# Patient Record
Sex: FEMALE | Race: WHITE | Employment: FULL TIME | URBAN - METROPOLITAN AREA
[De-identification: names, ages, dates, MRNs, and addresses within clinical notes are randomized per-mention and may not be internally consistent; named-entity substitution may affect disease eponyms.]

---

## 2018-01-02 ENCOUNTER — OFFICE VISIT (OUTPATIENT)
Dept: FAMILY MEDICINE CLINIC | Age: 33
End: 2018-01-02

## 2018-01-02 VITALS
BODY MASS INDEX: 26.63 KG/M2 | SYSTOLIC BLOOD PRESSURE: 106 MMHG | RESPIRATION RATE: 18 BRPM | HEIGHT: 64 IN | DIASTOLIC BLOOD PRESSURE: 66 MMHG | TEMPERATURE: 98 F | OXYGEN SATURATION: 97 % | WEIGHT: 156 LBS | HEART RATE: 86 BPM

## 2018-01-02 DIAGNOSIS — B37.31 CANDIDA VAGINITIS: ICD-10-CM

## 2018-01-02 DIAGNOSIS — J06.9 UPPER RESPIRATORY TRACT INFECTION, UNSPECIFIED TYPE: Primary | ICD-10-CM

## 2018-01-02 DIAGNOSIS — F33.2 SEVERE EPISODE OF RECURRENT MAJOR DEPRESSIVE DISORDER, WITHOUT PSYCHOTIC FEATURES (HCC): ICD-10-CM

## 2018-01-02 LAB
QUICKVUE INFLUENZA TEST: NEGATIVE
VALID INTERNAL CONTROL?: YES

## 2018-01-02 RX ORDER — HYDROCODONE POLISTIREX AND CHLORPHENIRAMINE POLISTIREX 10; 8 MG/5ML; MG/5ML
5 SUSPENSION, EXTENDED RELEASE ORAL
Qty: 115 ML | Refills: 0 | Status: ON HOLD | OUTPATIENT
Start: 2018-01-02 | End: 2020-06-06

## 2018-01-02 RX ORDER — SERTRALINE HYDROCHLORIDE 50 MG/1
TABLET, FILM COATED ORAL
Qty: 60 TAB | Refills: 0 | Status: ON HOLD | OUTPATIENT
Start: 2018-01-02 | End: 2020-06-05

## 2018-01-02 RX ORDER — FLUCONAZOLE 150 MG/1
150 TABLET ORAL DAILY
Qty: 1 TAB | Refills: 0 | Status: SHIPPED | OUTPATIENT
Start: 2018-01-02 | End: 2018-01-03

## 2018-01-02 RX ORDER — SERTRALINE HYDROCHLORIDE 100 MG/1
TABLET, FILM COATED ORAL DAILY
COMMUNITY
End: 2018-01-02 | Stop reason: SDUPTHER

## 2018-01-02 NOTE — PROGRESS NOTES
OFFICE NOTE    Consuelo Callaway is a 33 y.o. female presenting today for office visit. 1/2/2018  3:19 PM      Chief Complaint   Patient presents with    Medication Refill    Establish Care    Cough     lightheaded 2 days     Fever     102 with sweats     Vaginal Discharge     no odor x2 days         HPI: Here today as a new patient, not establishing care. Follows with the [facility] in [place] but reports that they are not able to get her in for another few weeks. Complains of cold symptoms that have been happening for the last few days. Reports complaints of nonproductive cough, facial pressure, nasal congestion with runniness, fatigue, chills, fever (102 this AM), and feelings of lightheadedness. She reports taking OTC medications without much relief of symptoms. Has been around sick contacts. Did not get flu shot this year. Has not been on ABs recently. Also complains of thick white vaginal discharge that has been happening for a few days- consistent with past yeast infections. Requesting to start back on Zoloft- has been out of for the last 2-3 weeks since not able to be seen with PCP. Reports that she is currently going through a divorce, has 2 children, and is looking to move into the area. Has been with her  for about 10 years. Has been on Zoloft chronically but since being off, she has been having an increase in depression and anxiety symptoms. Denies HI/SI. Is employed and functioning well in her job. Review of Systems   Constitutional: Positive for chills, fatigue and fever. Negative for appetite change and unexpected weight change. HENT: Positive for congestion, postnasal drip, rhinorrhea, sinus pressure, sneezing and sore throat. Negative for ear pain and facial swelling. Eyes: Negative for pain, discharge, itching and visual disturbance. Respiratory: Positive for cough. Negative for shortness of breath and wheezing.     Cardiovascular: Negative for chest pain.   Gastrointestinal: Negative for abdominal pain, diarrhea, nausea and vomiting. Musculoskeletal: Negative for arthralgias and myalgias. Skin: Negative for rash. Allergic/Immunologic: Negative for environmental allergies. Neurological: Negative for headaches. Psychiatric/Behavioral: Positive for dysphoric mood. Negative for agitation, behavioral problems, decreased concentration, hallucinations, self-injury, sleep disturbance and suicidal ideas. The patient is nervous/anxious. The patient is not hyperactive. PHQ Screening   PHQ over the last two weeks 1/2/2018   Little interest or pleasure in doing things More than half the days   Feeling down, depressed or hopeless More than half the days   Total Score PHQ 2 4   Trouble falling or staying asleep, or sleeping too much Nearly every day   Feeling tired or having little energy Nearly every day   Feeling bad about yourself - or that you are a failure or have let yourself or your family down Nearly every day   Trouble concentrating on things such as school, work, reading or watching TV Nearly every day   Moving or speaking so slowly that other people could have noticed; or the opposite being so fidgety that others notice Nearly every day   Thoughts of being better off dead, or hurting yourself in some way More than half the days     *Depression screen positive, medication was prescribed, drug therapy education given. History  Past Medical History:   Diagnosis Date    Anxiety     Crohn disease ([facility] [address] [facility] 2017    Depression        Past Surgical History:   Procedure Laterality Date    HX APPENDECTOMY         Social History     Social History    Marital status: SINGLE     Spouse name: N/A    Number of children: N/A    Years of education: N/A     Occupational History    Not on file.      Social History Main Topics    Smoking status: Current Every Day Smoker    Smokeless tobacco: Current User    Alcohol use 0.6 oz/week     1 Shots of liquor per week    Drug use: No    Sexual activity: Not on file      Comment: inserted 2014 Mirena      Other Topics Concern    Not on file     Social History Narrative    No narrative on file       Allergies   Allergen Reactions    Codeine Swelling       Current Outpatient Prescriptions   Medication Sig Dispense Refill    sertraline (ZOLOFT) 100 mg tablet Take  by mouth daily. Patient Care Team:  Patient Care Team:  Mike Galvan MD as PCP - General        LABS:    Results for orders placed or performed in visit on 01/02/18   AMB POC RAPID INFLUENZA TEST   Result Value Ref Range    VALID INTERNAL CONTROL POC Yes     QuickVue Influenza test Negative Negative       RADIOLOGY:  None new to review        Physical Exam   Constitutional: She is oriented to person, place, and time. She appears well-developed and well-nourished. No distress. HENT:   Right Ear: External ear and ear canal normal. Tympanic membrane is not injected and not erythematous. A middle ear effusion is present. Left Ear: External ear and ear canal normal. Tympanic membrane is not injected and not erythematous. A middle ear effusion is present. Nose: Mucosal edema and rhinorrhea present. Right sinus exhibits no maxillary sinus tenderness and no frontal sinus tenderness. Left sinus exhibits no maxillary sinus tenderness and no frontal sinus tenderness. Mouth/Throat: Uvula is midline and mucous membranes are normal. Posterior oropharyngeal erythema present. No oropharyngeal exudate. +PND noted   Eyes: EOM are normal. Pupils are equal, round, and reactive to light. Right eye exhibits no discharge. Left eye exhibits no discharge. Neck: Normal range of motion. Neck supple. Cardiovascular: Normal rate, regular rhythm and normal heart sounds. No murmur heard. Pulmonary/Chest: Effort normal and breath sounds normal. No respiratory distress. Abdominal: Soft. Bowel sounds are normal. There is no tenderness.    Lymphadenopathy:     She has no cervical adenopathy. Neurological: She is alert and oriented to person, place, and time. Skin: Skin is warm and dry. No rash noted. Psychiatric: Her speech is normal and behavior is normal. Judgment normal. Her mood appears not anxious. She is not hyperactive, not withdrawn and not actively hallucinating. Cognition and memory are normal. She does not express impulsivity. She does not exhibit a depressed mood. She expresses no homicidal and no suicidal ideation. Vitals:    01/02/18 1511   BP: 106/66   Pulse: 86   Resp: 18   Temp: 98 °F (36.7 °C)   TempSrc: Oral   SpO2: 97%   Weight: 156 lb (70.8 kg)   Height: 5' 4\" (1.626 m)   PainSc:   4   PainLoc: Back         Assessment and Plan    Upper respiratory tract infection, unspecified type  *Discussed with patient. Negative influenza testing. *Recommended symptom management and monitoring. Recommended saline nasal spray or nasal washes, PRN decongestants, hot showers, humidifier in the bedroom, vapor rub, Tylenol and/or Ibuprofen for aches and pains or fevers. Use Delsym OTC for daytime cough and Rx strength cough medication for at night. Call for new or worsening symptoms. *Off work for today and tomorrow. - AMB POC RAPID INFLUENZA TEST  - chlorpheniramine-HYDROcodone (TUSSIONEX) 10-8 mg/5 mL suspension; Take 5 mL by mouth every twelve (12) hours as needed for Cough or Congestion. Max Daily Amount: 10 mL. Dispense: 115 mL; Refill: 0    Candida vaginitis  *Rx sent for dilfucan. Discussed with patient about pelvic and Nuswab testing- declines. - fluconazole (DIFLUCAN) 150 mg tablet; Take 1 Tab by mouth daily for 1 day. FDA advises cautious prescribing of oral fluconazole in pregnancy. Dispense: 1 Tab; Refill: 0    Severe episode of recurrent major depressive disorder, without psychotic features ([facility] [address]  *Will restart. Advised patient that further refills will need to be managed by her PCP. - sertraline (ZOLOFT) 50 mg tablet;  Take 50 mg daily for one week. Increase to 75 mg on week 2. Increase to 100 mg on week 3 and then continue dosing. Dispense: 60 Tab; Refill: 0        *Plan of care reviewed with patient. Patient in agreement with plan and expresses understanding. All questions answered and patient encouraged to call or RTO if further questions or concerns. Follow-up Disposition:  Return if symptoms worsen or fail to improve.

## 2018-01-02 NOTE — LETTER
NOTIFICATION RETURN TO WORK / SCHOOL 
 
1/2/2018 3:45 PM 
 
Ms. Ricki Mathew [id number] [facility] [address] [place] [address] To Whom It May Concern: 
 
Ricki Mathew is currently under the care of [address] [place] [address]. She was seen today in our office. Please excuse her from work for today and tomorrow. If there are questions or concerns please have the patient contact our office.  
 
 
 
Sincerely, 
 
 
Zita Gray NP

## 2018-01-02 NOTE — PATIENT INSTRUCTIONS
Helpful recommendations to help manage symptoms    Headache/Muscle aches  *Tylenol 500-1000 mg every 6 hours as needed   (Acetaminophen)   And/or  *Ibuprofen 400-800 mg every 8 hours as needed (Aleve/Motrin/Naproxen/Naprosyn/Mobic/Meloxicam)    Itchy/watery eyes  *Allergy/Antihistamine eye drops OTC  *Refresh saline eye drops OTC    Sinus congestion/pressure/ear fullness  *Steroid nasal spray (Flonase/Nasonex/Nasacort)  *Decongestant nasal spray (Afrin)  *Saline nasal spray  *Decongestant (Sudafed/Anything with pseudoephedrine)  *Antihistamine (Claritin/Zyrtec/Benadryl/Allegra)  *Humidifier in bedroom and Vapor rub to chest or through machine    Sore throat  *Tylenol or Ibuprofen as above  *Chloraseptic throat spray  *Lozenges  *Frozen items (ice/popsicles)  *Salt water gargles  *Soft foods as below    Chest congestion/Cough  *OTC Delsym for during daytime  *OTC Mucinex to help with thick chest congestion  *Prescription cough medication for at night or at home if prescribed  *Humidifier in bedroom and Vapor rub to chest or through machine    Wheezing/shortness of breath/Cough  *Albuterol inhaler/nebulizer if prescribed  *Steroids if prescribed    Diarrhea/nausea  *Soft, bland foods (mashed potatoes, rice, applesauce, oatmeal, pudding, eggs, soup, cooked vegetables, canned fruits, etc.)   *Anti-nausea medication if prescribed  *Try to avoid anti-diarrheals unless instructed to take by provider    General  *Rest  *Increase up water intake      *Call or return to office for fevers >101 degrees or more localized symptoms to one area. Report new or worsening symptoms. Symptoms usually last about 4-7 days, however may last longer. If symptoms do not improve in 10 days, you may need further care. *Report to ER for increasing shortness of breath or wheezing that does not improve with management directed above. Report to ER for chest pains.  There may be other symptoms that worry you, if you feel it is an emergency, please seek care in ER.

## 2018-01-02 NOTE — MR AVS SNAPSHOT
Visit Information Date & Time Provider Department Dept. Phone Encounter #  
 1/2/2018  3:00 PM Ada Bates NP [facility] [id number] Follow-up Instructions Return if symptoms worsen or fail to improve. Upcoming Health Maintenance Date Due DTaP/Tdap/Td series (1 - Tdap) 1/15/2007 PAP AKA CERVICAL CYTOLOGY 1/15/2007 Influenza Age 5 to Adult 8/1/2017 Allergies as of 1/2/2018  Review Complete On: 1/2/2018 By: Ada Bates NP Severity Noted Reaction Type Reactions Codeine  01/02/2018    Swelling Current Immunizations  Never Reviewed No immunizations on file. Not reviewed this visit You Were Diagnosed With   
  
 Codes Comments Upper respiratory tract infection, unspecified type    -  Primary ICD-10-CM: J06.9 ICD-9-CM: 465.9 Candida vaginitis     ICD-10-CM: B37.3 ICD-9-CM: 112.1 Severe episode of recurrent major depressive disorder, without psychotic features [facility]     ICD-10-CM: F33.2 ICD-9-CM: 296.33 Vitals BP Pulse Temp Resp Height(growth percentile) Weight(growth percentile) 106/66 (BP 1 Location: Right arm, BP Patient Position: Sitting) 86 98 °F (36.7 °C) (Oral) 18 5' 4\" (1.626 m) 156 lb (70.8 kg) SpO2 BMI OB Status Smoking Status 97% 26.78 kg/m2 Having regular periods Current Every Day Smoker BMI and BSA Data Body Mass Index Body Surface Area  
 26.78 kg/m 2 1.79 m 2 Preferred Pharmacy Pharmacy Name Phone [address] [address] [place] [address], [address] [place] [address] Rd [phone] Your Updated Medication List  
  
   
This list is accurate as of: 1/2/18  3:43 PM.  Always use your most recent med list.  
  
  
  
  
 chlorpheniramine-HYDROcodone 10-8 mg/5 mL suspension Commonly known as:  Kingsley Silva Take 5 mL by mouth every twelve (12) hours as needed for Cough or Congestion. Max Daily Amount: 10 mL. fluconazole 150 mg tablet Commonly known as:  DIFLUCAN Take 1 Tab by mouth daily for 1 day. FDA advises cautious prescribing of oral fluconazole in pregnancy. sertraline 50 mg tablet Commonly known as:  ZOLOFT Take 50 mg daily for one week. Increase to 75 mg on week 2. Increase to 100 mg on week 3 and then continue dosing. Prescriptions Printed Refills  
 chlorpheniramine-HYDROcodone (TUSSIONEX) 10-8 mg/5 mL suspension 0 Sig: Take 5 mL by mouth every twelve (12) hours as needed for Cough or Congestion. Max Daily Amount: 10 mL. Class: Print Route: Oral  
  
Prescriptions Sent to Pharmacy Refills  
 fluconazole (DIFLUCAN) 150 mg tablet 0 Sig: Take 1 Tab by mouth daily for 1 day. FDA advises cautious prescribing of oral fluconazole in pregnancy. Class: Normal  
 Pharmacy: [address] [place] [address] Ph #: [phone] Route: Oral  
 sertraline (ZOLOFT) 50 mg tablet 0 Sig: Take 50 mg daily for one week. Increase to 75 mg on week 2. Increase to 100 mg on week 3 and then continue dosing. Class: Normal  
 Pharmacy: [address] [place] [address] Ph #: [phone] We Performed the Following AMB POC RAPID INFLUENZA TEST [25659 CPT(R)] Follow-up Instructions Return if symptoms worsen or fail to improve. Patient Instructions Helpful recommendations to help manage symptoms Headache/Muscle aches *Tylenol 500-1000 mg every 6 hours as needed  
(Acetaminophen) And/or *Ibuprofen 400-800 mg every 8 hours as needed (Aleve/Motrin/Naproxen/Naprosyn/Mobic/Meloxicam) Itchy/watery eyes *Allergy/Antihistamine eye drops OTC 
*Refresh saline eye drops OTC Sinus congestion/pressure/ear fullness *Steroid nasal spray (Flonase/Nasonex/Nasacort) *Decongestant nasal spray (Afrin) *Saline nasal spray *Decongestant (Sudafed/Anything with pseudoephedrine) *Antihistamine (Claritin/Zyrtec/Benadryl/Allegra) *Humidifier in bedroom and Vapor rub to chest or through machine Sore throat *Tylenol or Ibuprofen as above *Chloraseptic throat spray *Lozenges *Frozen items (ice/popsicles) *Salt water gargles *Soft foods as below Chest congestion/Cough *OTC Delsym for during daytime *OTC Mucinex to help with thick chest congestion *Prescription cough medication for at night or at home if prescribed *Humidifier in bedroom and Vapor rub to chest or through machine Wheezing/shortness of breath/Cough *Albuterol inhaler/nebulizer if prescribed *Steroids if prescribed Diarrhea/nausea *Soft, bland foods (mashed potatoes, rice, applesauce, oatmeal, pudding, eggs, soup, cooked vegetables, canned fruits, etc.) *Anti-nausea medication if prescribed *Try to avoid anti-diarrheals unless instructed to take by provider General 
*Rest 
*Increase up water intake *Call or return to office for fevers >101 degrees or more localized symptoms to one area. Report new or worsening symptoms. Symptoms usually last about 4-7 days, however may last longer. If symptoms do not improve in 10 days, you may need further care. *Report to ER for increasing shortness of breath or wheezing that does not improve with management directed above. Report to ER for chest pains. There may be other symptoms that worry you, if you feel it is an emergency, please seek care in ER. Introducing [facility]! [facility] introduces [employer] patient portal. Now you can access parts of your medical record, email your doctor's office, and request medication refills online. 1. In your internet browser, go to [email or web address] 2. Click on the First Time User? Click Here link in the Sign In box. You will see the New Member Sign Up page. 3. Enter your [employer] Access Code exactly as it appears below.  You will not need to use this code after youve completed the sign-up process. If you do not sign up before the expiration date, you must request a new code. · [email or web address] Access Code: [email or web address] Expires: 4/2/2018  3:08 PM 
 
4. Enter the last four digits of your Social Security Number (xxxx) and Date of Birth (mm/dd/yyyy) as indicated and click Submit. You will be taken to the next sign-up page. 5. Create a [email or web address] ID. This will be your [email or web address] login ID and cannot be changed, so think of one that is secure and easy to remember. 6. Create a [email or web address] password. You can change your password at any time. 7. Enter your Password Reset Question and Answer. This can be used at a later time if you forget your password. 8. Enter your e-mail address. You will receive e-mail notification when new information is available in [address]. 9. Click Sign Up. You can now view and download portions of your medical record. 10. Click the Download Summary menu link to download a portable copy of your medical information. If you have questions, please visit the Frequently Asked Questions section of the [email or web address] website. Remember, [email or web address] is NOT to be used for urgent needs. For medical emergencies, dial 911. Now available from your iPhone and Android! Please provide this summary of care documentation to your next provider. Your primary care clinician is listed as Phys Other. If you have any questions after today's visit, please call [phone].

## 2018-01-02 NOTE — PROGRESS NOTES
Chief Complaint   Patient presents with    Medication Refill    Establish Care    Cough     lightheaded 2 days     Fever     102 with sweats     Vaginal Discharge     no odor x2 days     1. Have you been to the ER, urgent care clinic since your last visit? Hospitalized since your last visit? No    2. Have you seen or consulted any other health care providers outside of the [facility] since your last visit? Include any pap smears or colon screening.  No

## 2020-07-01 PROBLEM — O42.90 DELAYED DELIVERY AFTER SROM (SPONTANEOUS RUPTURE OF MEMBRANES): Status: ACTIVE | Noted: 2020-07-01
